# Patient Record
Sex: MALE | Race: BLACK OR AFRICAN AMERICAN | NOT HISPANIC OR LATINO | Employment: UNEMPLOYED | URBAN - METROPOLITAN AREA
[De-identification: names, ages, dates, MRNs, and addresses within clinical notes are randomized per-mention and may not be internally consistent; named-entity substitution may affect disease eponyms.]

---
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
## 2021-02-01 PROBLEM — M32.14 LUPUS NEPHRITIS, ISN/RPS CLASS V: Status: ACTIVE | Noted: 2021-02-01

## 2021-02-01 PROBLEM — R76.8 POSITIVE ANA (ANTINUCLEAR ANTIBODY): Status: ACTIVE | Noted: 2021-02-01

## 2021-02-01 PROBLEM — Z86.711 HISTORY OF PULMONARY EMBOLISM: Status: ACTIVE | Noted: 2021-02-01

## 2021-02-22 PROBLEM — L98.492 INFECTED SKIN ULCER WITH FAT LAYER EXPOSED: Status: ACTIVE | Noted: 2020-10-08

## 2021-02-22 PROBLEM — I82.409 DVT (DEEP VENOUS THROMBOSIS): Status: ACTIVE | Noted: 2021-02-22

## 2021-02-22 PROBLEM — R05.9 COUGH: Status: ACTIVE | Noted: 2021-02-22

## 2021-02-22 PROBLEM — I82.4Y2 ACUTE DEEP VEIN THROMBOSIS (DVT) OF PROXIMAL VEIN OF LEFT LOWER EXTREMITY: Status: ACTIVE | Noted: 2019-08-12

## 2021-02-22 PROBLEM — Z86.711 HISTORY OF PULMONARY EMBOLISM: Status: RESOLVED | Noted: 2021-02-01 | Resolved: 2021-02-22

## 2021-02-23 ENCOUNTER — PATIENT MESSAGE (OUTPATIENT)
Dept: RHEUMATOLOGY | Facility: CLINIC | Age: 21
End: 2021-02-23

## 2021-03-24 NOTE — Clinical Note
The wire  distal right common femoral artery.  spoke with pt scheduled at [facility] on may 4 arrive at 0840 am palak vega--tiffanie

## 2021-05-06 ENCOUNTER — PATIENT MESSAGE (OUTPATIENT)
Dept: RESEARCH | Facility: HOSPITAL | Age: 21
End: 2021-05-06

## 2021-05-10 PROBLEM — Z79.01 ANTICOAGULATED ON COUMADIN: Status: ACTIVE | Noted: 2021-05-10

## 2021-05-11 ENCOUNTER — ANTI-COAG VISIT (OUTPATIENT)
Dept: CARDIOLOGY | Facility: CLINIC | Age: 21
End: 2021-05-11

## 2021-05-11 DIAGNOSIS — Z79.01 ANTICOAGULATED ON COUMADIN: Primary | ICD-10-CM

## 2021-05-11 DIAGNOSIS — I26.99 PULMONARY EMBOLISM, UNSPECIFIED CHRONICITY, UNSPECIFIED PULMONARY EMBOLISM TYPE, UNSPECIFIED WHETHER ACUTE COR PULMONALE PRESENT: ICD-10-CM

## 2021-05-11 DIAGNOSIS — I82.409 RECURRENT DEEP VEIN THROMBOSIS (DVT): ICD-10-CM

## 2021-05-11 DIAGNOSIS — D68.61 ANTIPHOSPHOLIPID ANTIBODY SYNDROME: ICD-10-CM

## 2021-05-19 ENCOUNTER — ANTI-COAG VISIT (OUTPATIENT)
Dept: CARDIOLOGY | Facility: CLINIC | Age: 21
End: 2021-05-19
Payer: MEDICAID

## 2021-05-19 DIAGNOSIS — I82.409 RECURRENT DEEP VEIN THROMBOSIS (DVT): Primary | ICD-10-CM

## 2021-05-19 DIAGNOSIS — Z79.01 ANTICOAGULATED ON COUMADIN: ICD-10-CM

## 2021-05-19 DIAGNOSIS — D68.61 ANTIPHOSPHOLIPID ANTIBODY SYNDROME: ICD-10-CM

## 2021-05-24 ENCOUNTER — ANTI-COAG VISIT (OUTPATIENT)
Dept: CARDIOLOGY | Facility: CLINIC | Age: 21
End: 2021-05-24
Payer: MEDICAID

## 2021-05-24 DIAGNOSIS — Z79.01 ANTICOAGULATED ON COUMADIN: ICD-10-CM

## 2021-05-24 DIAGNOSIS — D68.61 ANTIPHOSPHOLIPID ANTIBODY SYNDROME: ICD-10-CM

## 2021-05-24 DIAGNOSIS — I82.409 RECURRENT DEEP VEIN THROMBOSIS (DVT): Primary | ICD-10-CM

## 2021-05-27 ENCOUNTER — ANTI-COAG VISIT (OUTPATIENT)
Dept: CARDIOLOGY | Facility: CLINIC | Age: 21
End: 2021-05-27
Payer: MEDICAID

## 2021-05-27 DIAGNOSIS — Z79.01 ANTICOAGULATED ON COUMADIN: ICD-10-CM

## 2021-05-27 DIAGNOSIS — I82.409 RECURRENT DEEP VEIN THROMBOSIS (DVT): Primary | ICD-10-CM

## 2021-05-27 DIAGNOSIS — D68.61 ANTIPHOSPHOLIPID ANTIBODY SYNDROME: ICD-10-CM

## 2021-05-31 ENCOUNTER — ANTI-COAG VISIT (OUTPATIENT)
Dept: CARDIOLOGY | Facility: CLINIC | Age: 21
End: 2021-05-31
Payer: MEDICAID

## 2021-05-31 DIAGNOSIS — D68.61 ANTIPHOSPHOLIPID ANTIBODY SYNDROME: ICD-10-CM

## 2021-05-31 DIAGNOSIS — Z79.01 ANTICOAGULATED ON COUMADIN: ICD-10-CM

## 2021-05-31 DIAGNOSIS — I26.99 PULMONARY EMBOLISM, UNSPECIFIED CHRONICITY, UNSPECIFIED PULMONARY EMBOLISM TYPE, UNSPECIFIED WHETHER ACUTE COR PULMONALE PRESENT: Primary | ICD-10-CM

## 2021-05-31 DIAGNOSIS — I82.409 RECURRENT DEEP VEIN THROMBOSIS (DVT): ICD-10-CM

## 2021-06-03 ENCOUNTER — ANTI-COAG VISIT (OUTPATIENT)
Dept: CARDIOLOGY | Facility: CLINIC | Age: 21
End: 2021-06-03
Payer: MEDICAID

## 2021-06-03 DIAGNOSIS — D68.61 ANTIPHOSPHOLIPID ANTIBODY SYNDROME: ICD-10-CM

## 2021-06-03 DIAGNOSIS — I82.409 RECURRENT DEEP VEIN THROMBOSIS (DVT): Primary | ICD-10-CM

## 2021-06-03 DIAGNOSIS — Z79.01 ANTICOAGULATED ON COUMADIN: ICD-10-CM

## 2021-09-21 ENCOUNTER — IMMUNIZATION (OUTPATIENT)
Dept: PRIMARY CARE CLINIC | Facility: CLINIC | Age: 21
End: 2021-09-21
Payer: MEDICAID

## 2021-09-21 DIAGNOSIS — Z23 NEED FOR VACCINATION: Primary | ICD-10-CM

## 2021-09-21 PROCEDURE — 91300 COVID-19, MRNA, LNP-S, PF, 30 MCG/0.3 ML DOSE VACCINE: CPT | Mod: PBBFAC | Performed by: INTERNAL MEDICINE

## 2022-05-12 NOTE — PROGRESS NOTES
Subjective:       Patient ID: Shanika Soares is a 21 y.o. male.    Chief Complaint: Follow-up    Here in clinic for follow up   .  To xarelto  extremely well without any bleeding.  Says leg is   still swollen and slightly painful.   Absolutely no cardiopulmonary symptoms.  No cough, chest pain, SOB.  t   Hospital consult:  Zyshone is 18 year old who was admitted two weeks ago for an asymtpomatic saddle embolus.  Was discharged on eliquis.  He stopped taking his eliquis after a couple of days and two dys ago went to an outside ER for worsening chest pain, hemoptysis, shortness of breath.  Had a ct which showed some progression of his PE.  He was told to restart his eliquis and discharged home.  We were not called.  He coninues to endorse chest pain and hemoptysis so I told him to come to the Er for evaluation, and attempt at catheter directed lysis    Initial consult 4/23:  Shanika is a 17 yo who has presented to the ER three times over the last two and a half weeks with complaints of light sided chest pain and cough.  Though to be pleuritic and secondary to a virus.  Occ emesis as well.  Went to ER yesterday cause he was tired of the pain.  CT done there showed a partially occlusive saddle thrombus.   Pt had no shortness of breath, hypoxia, or any respiratory distress.  Was seen by cardiology here overnight and had so signs of right sided heart strain and had O2 sats of >95%.   Decision was made to not treat with tpa or with cath based thrombectomy due to low risk PE.  He was started on heparin overnight.      Follow-up  Pertinent negatives include no abdominal pain, chest pain, chills, coughing, diaphoresis, fatigue, fever, headaches, joint swelling, nausea, neck pain, rash, sore throat, vomiting or weakness.     Review of Systems   Constitutional: Negative for activity change, appetite change, chills, diaphoresis, fatigue, fever and unexpected weight change.   HENT: Negative for hearing loss, mouth sores,  nosebleeds, postnasal drip, rhinorrhea and sore throat.    Eyes: Negative for photophobia and visual disturbance.   Respiratory: Negative for cough and shortness of breath.    Cardiovascular: Negative for chest pain and palpitations.   Gastrointestinal: Negative for abdominal distention, abdominal pain, blood in stool, constipation, diarrhea, nausea and vomiting.   Genitourinary: Negative for decreased urine volume, dysuria, flank pain, frequency and hematuria.   Musculoskeletal: Negative for gait problem, joint swelling, neck pain and neck stiffness.   Skin: Negative for color change, pallor and rash.   Neurological: Negative for dizziness, tremors, seizures, weakness and headaches.   Hematological: Negative for adenopathy. Does not bruise/bleed easily.   Psychiatric/Behavioral: The patient is not nervous/anxious.        Results for JUAN MANUEL GIFFORD (MRN [id number]) as of 12/16/2019 10:23   Ref. Range 12/11/2019 12:15   WBC Latest Ref Range: 3.90 - 12.70 K/uL 6.80   RBC Latest Ref Range: 4.60 - 6.20 M/uL 4.21 (L)   Hemoglobin Latest Ref Range: 14.0 - 18.0 g/dL 12.7 (L)   Hematocrit Latest Ref Range: 40.0 - 54.0 % 38.1 (L)   MCV Latest Ref Range: 82 - 98 fL 90   MCH Latest Ref Range: 27.0 - 31.0 pg 30.1   MCHC Latest Ref Range: 32.0 - 36.0 g/dL 33.3   RDW Latest Ref Range: 11.5 - 14.5 % 13.9   Platelets Latest Ref Range: 150 - 350 K/uL 586 (H)   MPV Latest Ref Range: 7.4 - 10.4 fL 7.7   Gran% Latest Ref Range: 38.0 - 73.0 % 75.1 (H)   Gran # (ANC) Latest Ref Range: 1.8 - 7.7 K/uL 5.1   Lymph% Latest Ref Range: 18.0 - 48.0 % 15.2 (L)   Lymph # Latest Ref Range: 1.0 - 4.8 K/uL 1.0   Mono% Latest Ref Range: 4.0 - 15.0 % 8.8   Mono # Latest Ref Range: 0.3 - 1.0 K/uL 0.6   Eosinophil% Latest Ref Range: 0.0 - 8.0 % 0.5   Eos # Latest Ref Range: 0.0 - 0.5 K/uL 0.0   Basophil% Latest Ref Range: 0.0 - 1.9 % 0.4   Baso # Latest Ref Range: 0.00 - 0.20 K/uL 0.00   nRBC Latest Ref Range: 0 /100 WBC 0   Differential Method Unknown  Automated   Sed Rate Latest Ref Range: 0 - 15 mm/Hr 114 (H)   Retic Latest Ref Range: 0.4 - 2.0 % 0.7   Sodium Latest Ref Range: 136 - 145 mmol/L 141   Potassium Latest Ref Range: 3.5 - 5.1 mmol/L 3.7   Chloride Latest Ref Range: 95 - 110 mmol/L 102   CO2 Latest Ref Range: 23 - 29 mmol/L 28   BUN, Bld Latest Ref Range: 9 - 20 mg/dL 7 (L)   Creatinine Latest Ref Range: 0.80 - 1.50 mg/dL 0.80   eGFR if non African American Latest Ref Range: >60 mL/min/1.73 m^2 >60   eGFR if  Latest Ref Range: >60 mL/min/1.73 m^2 >60   Glucose Latest Ref Range: 74 - 106 mg/dL 94   Calcium Latest Ref Range: 8.4 - 10.2 mg/dL 8.9   Alkaline Phosphatase Latest Ref Range: 38 - 145 U/L 94   PROTEIN TOTAL Latest Ref Range: 6.3 - 8.2 g/dL 7.7   Albumin Latest Ref Range: 3.2 - 4.7 g/dL 3.3   BILIRUBIN TOTAL Latest Ref Range: 0.2 - 1.3 mg/dL 0.3   AST Latest Ref Range: 17 - 59 U/L 29   ALT Latest Ref Range: 10 - 44 U/L 9 (L)       Objective:      Physical Exam  Constitutional:       Appearance: He is well-developed.   HENT:      Head: Normocephalic and atraumatic.      Right Ear: External ear normal.      Left Ear: External ear normal.      Nose: Nose normal.   Eyes:      Pupils: Pupils are equal, round, and reactive to light.   Cardiovascular:      Rate and Rhythm: Normal rate and regular rhythm.      Heart sounds: No murmur heard.    No friction rub. No gallop.   Pulmonary:      Effort: Pulmonary effort is normal.      Breath sounds: Normal breath sounds. No wheezing or rales.   Abdominal:      General: Bowel sounds are normal. There is no distension.      Palpations: Abdomen is soft. There is no mass.      Tenderness: There is no abdominal tenderness. There is no guarding or rebound.   Musculoskeletal:         General: Normal range of motion.      Cervical back: Normal range of motion and neck supple.      Comments: Right knee and leg slightly swollen  Slight tenderness  Negative shyanne sign  FROM  Excellent pulses    Lymphadenopathy:      Cervical: No cervical adenopathy.   Skin:     General: Skin is warm.      Coloration: Skin is not pale.      Findings: No erythema or rash.   Neurological:      Mental Status: He is alert and oriented to person, place, and time.      Motor: No abnormal muscle tone.      Deep Tendon Reflexes: Reflexes are normal and symmetric.                              Assessment:       No diagnosis found.    Plan:       19 yo w/saddle PE, s/p thrombolysis as well as  massive right lower extremity DVT treated with local lytic followed by pharmacomechanical thrombectomy and right iliac vein stent.  re-treated with extended local lytic therapy (3 days) and very extensive local rheolytic thrombectomy with tPA with a good result a few weeks ago.  He was discharged with enoxaparin and Brilinta.     Still c oncerned about worsenig clot and leg swelling.  Will switchcont Xarelto.  Discussed with cardiology.  Will plan on rpt cath and thrombolysis    Sed rate still elevated.  Needs f/ with rheum    RTC in 1 wk    I spent 30 min with family >50% in counseling

## 2022-07-16 NOTE — NURSING
CBC, anti-Xa, and aPTT labs sent at 0057. Call from Hematology at 0148 reporting that anti-Xa and aPTT labs hemolyzed despite easy draw and quick send off to lab. Labs redrawn immediately and sent. Call from Hematology at 0224 reporting that aPTT critical at >150, likely contaminated with heparin. Redrawn from different site and sent. Will continue to monitor closely.  
Daily Discussion Tool    Usage Necessity Functionality Comments   CVL Days:  3 days   Lab Draws         no  Frequ:   IV Abx no  Frequ:   Inotropes no  TPN/IL no  Chemotherapy no  Other     Long-term tx no  Short-term tx yes  Difficult access no    Date of last PIV attempt:  08/23/2019 Leaking no  Blood return: DANILO  TPA: yes  TPA continuously infusing for clots  Sluggish flush: have not flushed  Frequent dressing changes no    CVL Site Assessment:    Clean, dry, intact, biopatch in place         PLAN FOR TODAY: Continue current plan    
Discharge instructions given to pt & mom including medications/next dose due, signs/symptoms when to notify MD reviewed, follow-up appt reviewed. PIV removed x2 with catheter intact.  
Nursing Transfer Note    Receiving Transfer Note    8/23/2019 10:53 PM  Received in transfer from Cath Lab to PICU 14  Report received as documented in PER Handoff on Doc Flowsheet.  See Doc Flowsheet for VS's and complete assessment.  Continuous EKG monitoring in place Yes  Chart received with patient: Yes  What Caregiver / Guardian was Notified of Arrival:None present  Patient and / or caregiver / guardian oriented to room and nurse call system.  Tejas Jorge RN  8/23/2019 10:53 PM        
No

## 2023-01-30 PROBLEM — N50.89 SCROTAL EDEMA: Status: ACTIVE | Noted: 2023-01-30

## 2023-02-13 NOTE — PLAN OF CARE
VSS, afebrile, no s/s of distress noted. Pt on continuous tele/pulse ox, no significant alarms. Neuro checks appropriate. Pt stable with heparin infusing @ 10.1 mL/hr via R IJ. NS @ 100 mL/hr started at 2200 via R forearm IV - sites CDI. No signs of bleeding noted. R leg remains swollen. Calf circumference measured at 40.5 cm and thigh circumference measured at 58.5 cm. Pt reports no tingling, numbness, or pain. Pt made NPO at midnight. Around 2200 pt vomitted 250 cc - pt said the hamburger he ate for dinner made his stomach upset - Dr. Hernandez aware. Pt received all meds as scheduled - no PRN meds needed. Pt resting comfortably in between care. No family present at bedside throughout shift. POC reviewed w/ pt, verbalized understanding. Will continue to monitor.    no weight-bearing restrictions

## 2023-05-25 NOTE — Clinical Note
Left message on mother and father's voicemail that an appointment with Pediatric Cardiology is available today at 3:00pm.  Left [facility] call back number for parent to confirm or decline the appointment. Sent one way text message to mother's phone requesting call back to confirm or decline.   Patient stated she would like to call her insurance to see if they can provide her with a formulary that may be less costly and insurance would cover. She will call back once she finds out this information.   The catheter is removed from the  proximal IVC.

## 2024-10-23 ENCOUNTER — PATIENT MESSAGE (OUTPATIENT)
Dept: GASTROENTEROLOGY | Facility: CLINIC | Age: 24
End: 2024-10-23

## (undated) DEVICE — GUIDEWIRE STF .035X260CM ANG

## (undated) DEVICE — Device

## (undated) DEVICE — CATH MULTI-TRACK 5FR

## (undated) DEVICE — GUIDEWIRE AMPLATZ .035X260

## (undated) DEVICE — KIT CUSTOM MANIFOLD

## (undated) DEVICE — SHEATH CLOTTRIEVER 13FR

## (undated) DEVICE — SHEATH AVANTI 6FR .014

## (undated) DEVICE — CATH ANGLED GLIDE CATH 4FR

## (undated) DEVICE — CATH GUIDE LAUNCH MB1 6F 100CM

## (undated) DEVICE — PACK PEDIATRIC ANGIOGRAPHY

## (undated) DEVICE — CATH ULTRAVERSE 035 6X100X130

## (undated) DEVICE — CATH 4FR 65CM BERN

## (undated) DEVICE — BLLN ATLAS 12 X 40 X 75

## (undated) DEVICE — COVER BAND BAG 28 X 12

## (undated) DEVICE — KIT PROBE COVER WITH GEL

## (undated) DEVICE — GUIDEWIRE COPE MANDRIL .018X60

## (undated) DEVICE — GUIDEWIRE AMPLATZ .035X260 SS

## (undated) DEVICE — BLLN MUSTANG 5 X 40 X 135

## (undated) DEVICE — SHEATH PINNACLE 8FR

## (undated) DEVICE — CATH DXTERITY PG145 110CM 6FR

## (undated) DEVICE — OMNIPAQUE 350 200ML

## (undated) DEVICE — SEE MEDLINE ITEM 157187

## (undated) DEVICE — CATH ANGIOJET OMNI-120CM

## (undated) DEVICE — COVER BAND BAG 40 X 40

## (undated) DEVICE — VISIPAQUE 320 200ML +PK

## (undated) DEVICE — SHEATH INTRODUCER 8FR 11CM

## (undated) DEVICE — GUIDE WIRE WHOLLY FLOPPY

## (undated) DEVICE — INTRODUCER GLIDESHEATH 4F 10CM

## (undated) DEVICE — CATH MPA2 INFINITI 4FR 100CM

## (undated) DEVICE — SHUTTLE SL 8FR X 90 CM

## (undated) DEVICE — SPIKE CONTRAST CONTROLLER

## (undated) DEVICE — SNARE 10MM DEVICE

## (undated) DEVICE — GUIDEWIRE STF .035X180CM ANG

## (undated) DEVICE — KIT MICROINTRO 4F .018X40X7CM

## (undated) DEVICE — CATH DXTERITY MPASHA 110CM 6FR

## (undated) DEVICE — INFLATOR ENCORE 26 BLLN INFL

## (undated) DEVICE — BLLN MUSTANG 6 X 60 X 135

## (undated) DEVICE — PROTECTION STATION PLUS

## (undated) DEVICE — CATH ARI 4FR

## (undated) DEVICE — SHEATH ANSEL FLEXOR 5FRX45CM

## (undated) DEVICE — GUIDEWIRE ANG STF .035INX18CM

## (undated) DEVICE — GUIDEWIRE EMERALD 150CM PTFE

## (undated) DEVICE — CATH ANGIOJET PROXI-90CM

## (undated) DEVICE — KIT POWER PULSE

## (undated) DEVICE — DRESSING TRANS 4X4 TEGADERM

## (undated) DEVICE — GUIDEWIRE SUPRA CORE 035 300CM

## (undated) DEVICE — SHEATH FLEXOR ANSEL 5FR 70CM

## (undated) DEVICE — VISE RADIFOCUS MULTI TORQUE

## (undated) DEVICE — DILATOR COONS TAPER 14FR

## (undated) DEVICE — LIFESTAR® BILIARY STENT 14 MM X 60 MM (80 CM DELIVERY CATHETER)
Type: IMPLANTABLE DEVICE | Site: LEG | Status: NON-FUNCTIONAL
Brand: LIFESTAR® BILIARY STENT

## (undated) DEVICE — DIALATOR COONS TAPER 12F 20CM

## (undated) DEVICE — CATH MP 4FR 125CM

## (undated) DEVICE — GUIDE WIRE LOC

## (undated) DEVICE — WIRE TIP DEFLECTING .035X145CM

## (undated) DEVICE — GUIDEWIRE STD .035X180CM ANG

## (undated) DEVICE — STOPCOCK 3-WAY

## (undated) DEVICE — SHEATH INTRODUCER 6FR 11CM

## (undated) DEVICE — SHEATH INTRODUCER 5FR 10CM

## (undated) DEVICE — WIRE GUIDE SAFE-T-J .035 260CM

## (undated) DEVICE — CATH WEDGE 6FR X 110CM

## (undated) DEVICE — KIT CO-PILOT

## (undated) DEVICE — CATH ZELANTE DVT ANGIOJET

## (undated) DEVICE — CATH EKOSONIC 5.4FR 12X106CM

## (undated) DEVICE — GLIDESHEATH SLENDER SS 5FR10CM

## (undated) DEVICE — LINE 60IN PRESSURE MON.

## (undated) DEVICE — GUIDE WIRE WHOLEY EXCHANGE 300

## (undated) DEVICE — GUIDEWIRE AMPLATZ .035X145 STR

## (undated) DEVICE — GUIDEWIRE X SPORT .014IN 190CM

## (undated) DEVICE — CUTTER LEAD

## (undated) DEVICE — LINE INJECTION 30IN 25/BX

## (undated) DEVICE — KIT GLIDESHEATH SLEND 6FR 10CM

## (undated) DEVICE — CATH CLOTTRIEVER 13FR 16MM

## (undated) DEVICE — CATH BLLN SEEKER .035IN 135CM

## (undated) DEVICE — CATH INFINITI JUDKINS JR4

## (undated) DEVICE — VALVE CONTROL COPILOT

## (undated) DEVICE — CATH ANG PIGTAIL 4FR INFINITY

## (undated) DEVICE — CATH ULTRAVERSE 035 10X100X75

## (undated) DEVICE — CATH BERNSTAN 5FR